# Patient Record
(demographics unavailable — no encounter records)

---

## 2025-05-21 NOTE — HISTORY OF PRESENT ILLNESS
[Mother] : mother [Yes] : Patient goes to dentist yearly [HPV] : HPV [Normal] : normal [Grade: ____] : Grade: [unfilled] [At least 1 hour of physical activity a day] : at least 1 hour of physical activity a day [No] : No cigarette smoke exposure [FreeTextEntry7] :  Patient is here for wcc. [FreeTextEntry8] : 12/13 age on menarche [de-identified] : [facility]  [NO] : No [FreeTextEntry1] : [place] moving - to [employer]  and needs physical for camp [employer]

## 2025-05-21 NOTE — DISCUSSION/SUMMARY
[Normal Growth] : growth [Normal Development] : development  [No Elimination Concerns] : elimination [Continue Regimen] : feeding [No Skin Concerns] : skin [Normal Sleep Pattern] : sleep [None] : no medical problems [Anticipatory Guidance Given] : Anticipatory guidance addressed as per the history of present illness section [Physical Growth and Development] : physical growth and development [Social and Academic Competence] : social and academic competence [Emotional Well-Being] : emotional well-being [Risk Reduction] : risk reduction [Violence and Injury Prevention] : violence and injury prevention [No Vaccines] : no vaccines needed [No Medications] : ~He/She~ is not on any medications [Patient] : patient [Parent/Guardian] : Parent/Guardian [Full Activity without restrictions including Physical Education & Athletics] : Full Activity without restrictions including Physical Education & Athletics [FreeTextEntry1] : Continue balanced diet with all food groups. Brush teeth twice a day with toothbrush. Recommend visit to dentist. Maintain consistent daily routines and sleep schedule. Personal hygiene, puberty, and sexual health reviewed. Risky behaviors assessed. School discussed. Limit screen time to no more than 2 hours per day. Encourage physical activity. Return 1 year for routine well child check. hpv declined moving to [place]